# Patient Record
Sex: FEMALE
[De-identification: names, ages, dates, MRNs, and addresses within clinical notes are randomized per-mention and may not be internally consistent; named-entity substitution may affect disease eponyms.]

---

## 2019-09-17 ENCOUNTER — APPOINTMENT (OUTPATIENT)
Dept: ORTHOPEDIC SURGERY | Facility: CLINIC | Age: 64
End: 2019-09-17
Payer: COMMERCIAL

## 2019-09-17 VITALS — WEIGHT: 150 LBS | BODY MASS INDEX: 29.45 KG/M2 | HEIGHT: 60 IN

## 2019-09-17 DIAGNOSIS — M75.51 BURSITIS OF RIGHT SHOULDER: ICD-10-CM

## 2019-09-17 PROBLEM — Z00.00 ENCOUNTER FOR PREVENTIVE HEALTH EXAMINATION: Status: ACTIVE | Noted: 2019-09-17

## 2019-09-17 PROCEDURE — 99214 OFFICE O/P EST MOD 30 MIN: CPT

## 2019-09-17 PROCEDURE — 73030 X-RAY EXAM OF SHOULDER: CPT | Mod: RT

## 2019-09-17 PROCEDURE — 73562 X-RAY EXAM OF KNEE 3: CPT

## 2019-09-17 RX ORDER — MELOXICAM 15 MG/1
15 TABLET ORAL DAILY
Qty: 14 | Refills: 2 | Status: ACTIVE | COMMUNITY
Start: 2019-09-17 | End: 1900-01-01

## 2019-09-17 NOTE — REVIEW OF SYSTEMS
[Joint Pain] : no joint pain [Arthralgia] : arthralgia [Joint Stiffness] : no joint stiffness [Joint Swelling] : no joint swelling [Negative] : Heme/Lymph

## 2019-09-17 NOTE — DISCUSSION/SUMMARY
[de-identified] : This patient we'll give this time to get better. Prescription was given for physical therapy. I placed her on an anti-inflammatory. Follow up will be as needed

## 2019-09-17 NOTE — HISTORY OF PRESENT ILLNESS
[de-identified] : Location: right knee , right shoulder\par Quality:  sharp\par Duration: 09/8/2019\par Context: atraumatic\par Aggravating Factors: walking, weightbearing  \par Conservative treatment: heat, ice otc\par Associated Symptoms: stiffness\par Prior Studies: n.a\par

## 2019-09-17 NOTE — PHYSICAL EXAM
[de-identified] : Right shoulder shows a full range of motion. Positive impingement sign no weakness neurovascular exam is normal.\par \par Right knee shows no warmth or swelling there is tenderness over the past anserine bursitis. No joint line pain negative Fina no instability neurovascular exam is [de-identified] : Right shoulder x-ray shows no evidence of fracture dislocation.\par \par Right knee x-ray shows no evidence of fracture dislocation to her

## 2019-09-23 ENCOUNTER — RX RENEWAL (OUTPATIENT)
Age: 64
End: 2019-09-23

## 2019-09-23 DIAGNOSIS — M79.661 PAIN IN RIGHT LOWER LEG: ICD-10-CM

## 2019-09-23 DIAGNOSIS — M23.91 UNSPECIFIED INTERNAL DERANGEMENT OF RIGHT KNEE: ICD-10-CM
